# Patient Record
Sex: FEMALE | Race: WHITE | NOT HISPANIC OR LATINO | Employment: STUDENT | URBAN - NONMETROPOLITAN AREA
[De-identification: names, ages, dates, MRNs, and addresses within clinical notes are randomized per-mention and may not be internally consistent; named-entity substitution may affect disease eponyms.]

---

## 2019-10-01 ENCOUNTER — PROCEDURE VISIT (OUTPATIENT)
Dept: AUDIOLOGY | Facility: CLINIC | Age: 29
End: 2019-10-01

## 2019-10-01 DIAGNOSIS — Z46.2 ENCOUNTER FOR OTHER EARMOLD IMPRESSION: Primary | ICD-10-CM

## 2019-10-01 PROCEDURE — HEARINGNOCHG: Performed by: AUDIOLOGIST

## 2019-10-01 NOTE — PROGRESS NOTES
"   EAR MOLDS    Name:  Paola Batista  :  1990  Age:  29 y.o.  Date of Evaluation:  10/1/2019      HISTORY    Reason for visit:  Paola Batista is seen today to have an anesthesiology ear piece made.  Patient reports she needs a custom mold for her classes.      OFFICE VISIT    During today's visit an impression was made of her left ear with no trauma to her ear canal or ear drum.  The order will be sent to [facility] with the following specs:  Acrylic, blue and purple swirl, initials \"LMH\", and 6 foot precordial tubing.  Her cost of $88.00 was prepaid at this time.      Once the ear piece arrives, she will be contacted to come pick it up.    It was a pleasure seeing Paola Batista in Audiology today.  It is a pleasure helping Ms. Batista with her Audiology needs.             This document has been electronically signed by Leilani Hart MS CCC-FARZAD on 2019 10:20 AM       Leilani Hart MS CCC-A  Licensed Audiologist    For Billing and Coding:  Z46.2  Encounter for Other Earmold Impression - no charge      "

## 2021-02-04 ENCOUNTER — OFFICE (OUTPATIENT)
Dept: URBAN - METROPOLITAN AREA CLINIC 75 | Facility: CLINIC | Age: 31
End: 2021-02-04
Payer: MEDICAID

## 2021-02-04 VITALS — HEIGHT: 66 IN | HEART RATE: 80 BPM | WEIGHT: 152.8 LBS | OXYGEN SATURATION: 97 % | TEMPERATURE: 97 F

## 2021-02-04 DIAGNOSIS — R11.2 NAUSEA WITH VOMITING, UNSPECIFIED: ICD-10-CM

## 2021-02-04 DIAGNOSIS — R10.30 LOWER ABDOMINAL PAIN, UNSPECIFIED: ICD-10-CM

## 2021-02-04 DIAGNOSIS — K62.5 HEMORRHAGE OF ANUS AND RECTUM: ICD-10-CM

## 2021-02-04 DIAGNOSIS — R14.0 ABDOMINAL DISTENSION (GASEOUS): ICD-10-CM

## 2021-02-04 DIAGNOSIS — R19.7 DIARRHEA, UNSPECIFIED: ICD-10-CM

## 2021-02-04 PROCEDURE — 99204 OFFICE O/P NEW MOD 45 MIN: CPT | Performed by: INTERNAL MEDICINE

## 2021-02-04 RX ORDER — HYOSCYAMINE SULFATE 0.12 MG/1
0.5 TABLET, ORALLY DISINTEGRATING ORAL
Qty: 45 | Refills: 5 | Status: COMPLETED
Start: 2021-02-04 | End: 2021-04-28

## 2021-02-26 ENCOUNTER — ON CAMPUS - OUTPATIENT (OUTPATIENT)
Dept: URBAN - METROPOLITAN AREA HOSPITAL 108 | Facility: HOSPITAL | Age: 31
End: 2021-02-26
Payer: MEDICAID

## 2021-02-26 DIAGNOSIS — R11.2 NAUSEA WITH VOMITING, UNSPECIFIED: ICD-10-CM

## 2021-02-26 DIAGNOSIS — K52.89 OTHER SPECIFIED NONINFECTIVE GASTROENTERITIS AND COLITIS: ICD-10-CM

## 2021-02-26 DIAGNOSIS — K31.89 OTHER DISEASES OF STOMACH AND DUODENUM: ICD-10-CM

## 2021-02-26 DIAGNOSIS — R10.30 LOWER ABDOMINAL PAIN, UNSPECIFIED: ICD-10-CM

## 2021-02-26 DIAGNOSIS — K29.50 UNSPECIFIED CHRONIC GASTRITIS WITHOUT BLEEDING: ICD-10-CM

## 2021-02-26 PROCEDURE — 43239 EGD BIOPSY SINGLE/MULTIPLE: CPT | Performed by: INTERNAL MEDICINE

## 2021-02-26 PROCEDURE — 45380 COLONOSCOPY AND BIOPSY: CPT | Performed by: INTERNAL MEDICINE

## 2021-04-28 ENCOUNTER — OFFICE (OUTPATIENT)
Dept: URBAN - METROPOLITAN AREA CLINIC 75 | Facility: CLINIC | Age: 31
End: 2021-04-28
Payer: MEDICAID

## 2021-04-28 VITALS
WEIGHT: 147 LBS | TEMPERATURE: 97.5 F | SYSTOLIC BLOOD PRESSURE: 110 MMHG | DIASTOLIC BLOOD PRESSURE: 62 MMHG | HEIGHT: 66 IN

## 2021-04-28 DIAGNOSIS — R19.7 DIARRHEA, UNSPECIFIED: ICD-10-CM

## 2021-04-28 DIAGNOSIS — K59.00 CONSTIPATION, UNSPECIFIED: ICD-10-CM

## 2021-04-28 DIAGNOSIS — K60.2 ANAL FISSURE, UNSPECIFIED: ICD-10-CM

## 2021-04-28 DIAGNOSIS — R10.13 EPIGASTRIC PAIN: ICD-10-CM

## 2021-04-28 PROCEDURE — 99214 OFFICE O/P EST MOD 30 MIN: CPT | Performed by: NURSE PRACTITIONER

## 2021-04-28 RX ORDER — HYOSCYAMINE SULFATE 0.12 MG/1
0.75 TABLET ORAL; SUBLINGUAL
Qty: 60 | Refills: 3 | Status: ACTIVE
Start: 2021-04-28